# Patient Record
Sex: MALE | Race: WHITE | Employment: FULL TIME | URBAN - METROPOLITAN AREA
[De-identification: names, ages, dates, MRNs, and addresses within clinical notes are randomized per-mention and may not be internally consistent; named-entity substitution may affect disease eponyms.]

---

## 2023-09-01 ENCOUNTER — HOSPITAL ENCOUNTER (OUTPATIENT)
Age: 38
Setting detail: SPECIMEN
Discharge: HOME | End: 2023-09-01

## 2023-09-01 LAB
CHOLEST SERPL-MCNC: 166 MG/DL
CHOLESTEROL/HDL RATIO: 2.8
HDLC SERPL-MCNC: 60 MG/DL
LDLC SERPL CALC-MCNC: 94 MG/DL (ref 0–130)
TRIGL SERPL-MCNC: 58 MG/DL

## 2023-12-26 ENCOUNTER — TELEPHONE (OUTPATIENT)
Age: 38
End: 2023-12-26

## 2023-12-26 NOTE — TELEPHONE ENCOUNTER
Patient called asking for a prescription of Valacyclovir. Patient said it has been awhile since he has been on it and he is having a flair up.  Patient made a appointment for 12/28

## 2023-12-28 ENCOUNTER — OFFICE VISIT (OUTPATIENT)
Age: 38
End: 2023-12-28
Payer: COMMERCIAL

## 2023-12-28 VITALS
TEMPERATURE: 98 F | BODY MASS INDEX: 27.4 KG/M2 | HEART RATE: 69 BPM | DIASTOLIC BLOOD PRESSURE: 86 MMHG | OXYGEN SATURATION: 98 % | HEIGHT: 68 IN | WEIGHT: 180.8 LBS | SYSTOLIC BLOOD PRESSURE: 122 MMHG

## 2023-12-28 DIAGNOSIS — F33.0 MILD RECURRENT MAJOR DEPRESSION (HCC): ICD-10-CM

## 2023-12-28 DIAGNOSIS — E29.1 TESTICULAR HYPOFUNCTION: Primary | ICD-10-CM

## 2023-12-28 PROCEDURE — 99213 OFFICE O/P EST LOW 20 MIN: CPT | Performed by: PHYSICIAN ASSISTANT

## 2023-12-28 RX ORDER — BUPROPION HYDROCHLORIDE 300 MG/1
300 TABLET ORAL EVERY MORNING
Qty: 30 TABLET | Refills: 5 | Status: SHIPPED | OUTPATIENT
Start: 2023-12-28

## 2023-12-28 RX ORDER — TESTOSTERONE CYPIONATE 1000 MG/10ML
100 INJECTION, SOLUTION INTRAMUSCULAR ONCE
COMMUNITY

## 2023-12-28 RX ORDER — BUPROPION HYDROCHLORIDE 300 MG/1
300 TABLET ORAL EVERY MORNING
COMMUNITY
Start: 2023-07-24 | End: 2023-12-28 | Stop reason: SDUPTHER

## 2024-04-17 ENCOUNTER — OFFICE VISIT (OUTPATIENT)
Age: 39
End: 2024-04-17

## 2024-04-17 VITALS
BODY MASS INDEX: 27.28 KG/M2 | OXYGEN SATURATION: 98 % | WEIGHT: 180 LBS | SYSTOLIC BLOOD PRESSURE: 110 MMHG | HEIGHT: 68 IN | HEART RATE: 90 BPM | DIASTOLIC BLOOD PRESSURE: 82 MMHG | TEMPERATURE: 98 F

## 2024-04-17 DIAGNOSIS — E29.1 TESTICULAR HYPOFUNCTION: ICD-10-CM

## 2024-04-17 DIAGNOSIS — G47.33 OBSTRUCTIVE SLEEP APNEA: ICD-10-CM

## 2024-04-17 DIAGNOSIS — B07.0 PLANTAR WART OF RIGHT FOOT: ICD-10-CM

## 2024-04-17 DIAGNOSIS — F33.0 MILD RECURRENT MAJOR DEPRESSION (HCC): Primary | ICD-10-CM

## 2024-04-17 ASSESSMENT — PATIENT HEALTH QUESTIONNAIRE - PHQ9
SUM OF ALL RESPONSES TO PHQ QUESTIONS 1-9: 0
1. LITTLE INTEREST OR PLEASURE IN DOING THINGS: NOT AT ALL
8. MOVING OR SPEAKING SO SLOWLY THAT OTHER PEOPLE COULD HAVE NOTICED. OR THE OPPOSITE, BEING SO FIGETY OR RESTLESS THAT YOU HAVE BEEN MOVING AROUND A LOT MORE THAN USUAL: NOT AT ALL
SUM OF ALL RESPONSES TO PHQ QUESTIONS 1-9: 0
9. THOUGHTS THAT YOU WOULD BE BETTER OFF DEAD, OR OF HURTING YOURSELF: NOT AT ALL
7. TROUBLE CONCENTRATING ON THINGS, SUCH AS READING THE NEWSPAPER OR WATCHING TELEVISION: NOT AT ALL
2. FEELING DOWN, DEPRESSED OR HOPELESS: NOT AT ALL
SUM OF ALL RESPONSES TO PHQ QUESTIONS 1-9: 0
SUM OF ALL RESPONSES TO PHQ QUESTIONS 1-9: 0
SUM OF ALL RESPONSES TO PHQ9 QUESTIONS 1 & 2: 0
6. FEELING BAD ABOUT YOURSELF - OR THAT YOU ARE A FAILURE OR HAVE LET YOURSELF OR YOUR FAMILY DOWN: NOT AT ALL
4. FEELING TIRED OR HAVING LITTLE ENERGY: NOT AT ALL
3. TROUBLE FALLING OR STAYING ASLEEP: NOT AT ALL
5. POOR APPETITE OR OVEREATING: NOT AT ALL

## 2024-04-17 ASSESSMENT — ENCOUNTER SYMPTOMS
SHORTNESS OF BREATH: 0
CONSTIPATION: 0
SINUS PAIN: 0
BACK PAIN: 0
NAUSEA: 0
SORE THROAT: 0
EYE REDNESS: 0
SINUS PRESSURE: 0
VOMITING: 0
ABDOMINAL PAIN: 0
DIARRHEA: 0
COUGH: 0
EYE PAIN: 0
BLOOD IN STOOL: 0
RHINORRHEA: 0
WHEEZING: 0

## 2024-04-17 NOTE — PROGRESS NOTES
[facility]     Date of Visit:  2024  Patient Name: Rah Buckley   Patient :  1985     CHIEF COMPLAINT:     Rah Buckley is a 38 y.o. male who presents today for an general visit to be evaluated for the following condition(s):  Chief Complaint   Patient presents with    Medication Check    3 Month Follow-Up    Discuss Labs       HISTORY OF PRESENT ILLNESS      Last note reviewed from Dr. Lopes.  Patient required phlebotomy due to elevated hemoglobin.   RBC count down to 5.71 with hemoglobin 16.7 and hematocrit 40.6.  No mini stroke symptoms.now on half dose testosterone replacement. No depression issues. Going through a divorce, trying to get full custody of 5 kids.      Urinating well.     REVIEW OF SYSTEMS     Review of Systems   Constitutional:  Negative for chills, fever and unexpected weight change.   HENT:  Negative for congestion, ear pain, hearing loss, rhinorrhea, sinus pressure, sinus pain, sneezing and sore throat.    Eyes:  Negative for pain, redness and visual disturbance.   Respiratory:  Negative for cough, shortness of breath and wheezing.    Cardiovascular:  Negative for chest pain, palpitations and leg swelling.   Gastrointestinal:  Negative for abdominal pain, blood in stool, constipation, diarrhea, nausea and vomiting.   Endocrine: Negative for cold intolerance and heat intolerance.   Genitourinary:  Negative for difficulty urinating, dysuria, frequency, hematuria and urgency.   Musculoskeletal:  Negative for arthralgias, back pain, gait problem, joint swelling, myalgias and neck pain.   Skin:  Negative for rash and wound.   Allergic/Immunologic: Negative for immunocompromised state.   Neurological:  Negative for dizziness, tremors, seizures, syncope, speech difficulty, weakness, light-headedness, numbness and headaches.   Psychiatric/Behavioral:  Negative for confusion, hallucinations and sleep disturbance. The patient is not nervous/anxious.         REVIEWED

## 2024-04-24 ENCOUNTER — TELEPHONE (OUTPATIENT)
Age: 39
End: 2024-04-24

## 2024-04-24 NOTE — TELEPHONE ENCOUNTER
[employer] called to let us know that patient has been approved for his Sleep Study at [facility].  Authorization # [id number]

## 2024-04-30 ENCOUNTER — HOSPITAL ENCOUNTER (OUTPATIENT)
Dept: SLEEP CENTER | Age: 39
Discharge: HOME | End: 2024-05-02
Attending: INTERNAL MEDICINE
Payer: COMMERCIAL

## 2024-04-30 DIAGNOSIS — G47.33 OBSTRUCTIVE SLEEP APNEA: ICD-10-CM

## 2024-04-30 PROCEDURE — G0399 HOME SLEEP TEST/TYPE 3 PORTA: HCPCS

## 2024-05-13 LAB — STATUS: NORMAL

## 2024-05-17 DIAGNOSIS — G47.33 OBSTRUCTIVE SLEEP APNEA: Primary | ICD-10-CM

## 2024-06-06 ENCOUNTER — TELEPHONE (OUTPATIENT)
Age: 39
End: 2024-06-06

## 2024-06-06 NOTE — TELEPHONE ENCOUNTER
[employer] called Stacy stating that the CPAP titration study was denied due to Patient's condition/diagnosis does not qualify.  If we want to have it reviewed call [phone].     Called above # [phone] for Physician review, spoke with Bita who stated that the sleep titration study was denied base on the sleep study results.      I called [facility] ([phone]) they were the one who requested the CPAP titration sleep study, Spoke with Nneka and informed her that the CPAP titration sleep study was denied by the Patient's insurance.   Nneka said in this cases they will just set the Patient up with the CPAP machine and go from there, If that's OK with our office.   I informed Nneka that if insurance covers what it's fine with us and I will informed Dr. Okeefe.     Called and spoke with Patient and informed of the denial but Nneka from the sleep center said they will set him up with the CPAP machine.   Further informed him to call and speak with Nneka.

## 2024-09-18 DIAGNOSIS — F33.0 MILD RECURRENT MAJOR DEPRESSION (HCC): Primary | ICD-10-CM

## 2024-09-18 RX ORDER — BUPROPION HYDROCHLORIDE 300 MG/1
300 TABLET ORAL EVERY MORNING
Qty: 30 TABLET | Refills: 1 | Status: SHIPPED | OUTPATIENT
Start: 2024-09-18

## 2024-10-29 ENCOUNTER — OFFICE VISIT (OUTPATIENT)
Age: 39
End: 2024-10-29
Payer: COMMERCIAL

## 2024-10-29 VITALS
DIASTOLIC BLOOD PRESSURE: 80 MMHG | OXYGEN SATURATION: 97 % | TEMPERATURE: 98 F | HEART RATE: 97 BPM | HEIGHT: 68 IN | BODY MASS INDEX: 27.37 KG/M2 | SYSTOLIC BLOOD PRESSURE: 110 MMHG

## 2024-10-29 DIAGNOSIS — F33.0 MILD RECURRENT MAJOR DEPRESSION (HCC): Primary | ICD-10-CM

## 2024-10-29 DIAGNOSIS — G47.33 OBSTRUCTIVE SLEEP APNEA: ICD-10-CM

## 2024-10-29 DIAGNOSIS — M77.8 TENDINITIS OF RIGHT FOREARM: ICD-10-CM

## 2024-10-29 DIAGNOSIS — Z23 IMMUNIZATION DUE: ICD-10-CM

## 2024-10-29 DIAGNOSIS — E29.1 TESTICULAR HYPOFUNCTION: ICD-10-CM

## 2024-10-29 DIAGNOSIS — M85.80 OSTEOPENIA, UNSPECIFIED LOCATION: ICD-10-CM

## 2024-10-29 PROCEDURE — 99214 OFFICE O/P EST MOD 30 MIN: CPT | Performed by: INTERNAL MEDICINE

## 2024-10-29 RX ORDER — ALENDRONATE SODIUM 35 MG/1
35 TABLET ORAL
COMMUNITY
Start: 2024-08-28

## 2024-10-29 ASSESSMENT — ENCOUNTER SYMPTOMS
SHORTNESS OF BREATH: 0
BACK PAIN: 0
ABDOMINAL PAIN: 0
EYE REDNESS: 0
SINUS PRESSURE: 0
COUGH: 0
NAUSEA: 0
VOMITING: 0
SINUS PAIN: 0
CONSTIPATION: 0
RHINORRHEA: 0
BLOOD IN STOOL: 0
SORE THROAT: 0
EYE PAIN: 0
DIARRHEA: 0
WHEEZING: 0

## 2024-10-29 NOTE — PROGRESS NOTES
[facility]     Date of Visit:  10/29/2024  Patient Name: Rah Buckley   Patient :  1985     CHIEF COMPLAINT:     Rah Buckley is a 39 y.o. male who presents today for an general visit to be evaluated for the following condition(s):  Chief Complaint   Patient presents with    6 Month Follow-Up       HISTORY OF PRESENT ILLNESS    Overall doing well.  Work stresses stable.  He now has custody of his 14 and 16-year-old and will soon be getting custody of his 69-year-old.  His 90-year-old daughter is .  Still going through divorce issues.    He notes some right upper anterior chest \"hollow feeling\" once every few months.  He works out today doing 70 polyps 7 sets of 10 as well as push-ups and weightlifting.  He notes some right biceps and right forearm discomfort.  Right-handed.       REVIEW OF SYSTEMS     Review of Systems   Constitutional:  Negative for chills, fever and unexpected weight change.   HENT:  Negative for congestion, ear pain, hearing loss, rhinorrhea, sinus pressure, sinus pain, sneezing and sore throat.    Eyes:  Negative for pain, redness and visual disturbance.   Respiratory:  Negative for cough, shortness of breath and wheezing.    Cardiovascular:  Negative for chest pain, palpitations and leg swelling.   Gastrointestinal:  Negative for abdominal pain, blood in stool, constipation, diarrhea, nausea and vomiting.   Endocrine: Negative for cold intolerance and heat intolerance.   Genitourinary:  Negative for difficulty urinating, dysuria, frequency, hematuria and urgency.   Musculoskeletal:  Negative for arthralgias, back pain, gait problem, joint swelling, myalgias and neck pain.   Skin:  Negative for rash and wound.   Allergic/Immunologic: Negative for immunocompromised state.   Neurological:  Negative for dizziness, tremors, seizures, syncope, speech difficulty, weakness, light-headedness, numbness and headaches.   Psychiatric/Behavioral:  Negative for confusion, hallucinations

## 2024-11-20 DIAGNOSIS — F33.0 MILD RECURRENT MAJOR DEPRESSION (HCC): ICD-10-CM

## 2024-11-20 RX ORDER — BUPROPION HYDROCHLORIDE 300 MG/1
300 TABLET ORAL EVERY MORNING
Qty: 90 TABLET | Refills: 1 | Status: SHIPPED | OUTPATIENT
Start: 2024-11-20

## 2024-11-20 NOTE — TELEPHONE ENCOUNTER
buPROPion (WELLBUTRIN XL) 300 MG extended release tablet  Take 1 tablet by mouth every morning, Disp-30 tablet, R-1  Normal, Last Dose: Not Recorded    Patient took his last pill this morning

## 2025-02-11 ENCOUNTER — OFFICE VISIT (OUTPATIENT)
Age: 40
End: 2025-02-11
Payer: COMMERCIAL

## 2025-02-11 VITALS
HEART RATE: 60 BPM | HEIGHT: 68 IN | WEIGHT: 185 LBS | DIASTOLIC BLOOD PRESSURE: 86 MMHG | BODY MASS INDEX: 28.04 KG/M2 | TEMPERATURE: 97.7 F | OXYGEN SATURATION: 97 % | SYSTOLIC BLOOD PRESSURE: 116 MMHG

## 2025-02-11 DIAGNOSIS — G47.33 OSA (OBSTRUCTIVE SLEEP APNEA): ICD-10-CM

## 2025-02-11 DIAGNOSIS — M51.360 DEGENERATION OF INTERVERTEBRAL DISC OF LUMBAR REGION WITH DISCOGENIC BACK PAIN: Primary | ICD-10-CM

## 2025-02-11 DIAGNOSIS — F33.0 MILD RECURRENT MAJOR DEPRESSION (HCC): ICD-10-CM

## 2025-02-11 DIAGNOSIS — M85.80 OSTEOPENIA, UNSPECIFIED LOCATION: ICD-10-CM

## 2025-02-11 DIAGNOSIS — E29.1 TESTICULAR HYPOFUNCTION: ICD-10-CM

## 2025-02-11 PROCEDURE — 99214 OFFICE O/P EST MOD 30 MIN: CPT | Performed by: INTERNAL MEDICINE

## 2025-02-11 RX ORDER — MELOXICAM 15 MG/1
15 TABLET ORAL DAILY
Qty: 30 TABLET | Refills: 0 | Status: SHIPPED | OUTPATIENT
Start: 2025-02-11

## 2025-02-11 RX ORDER — CYCLOBENZAPRINE HCL 10 MG
10 TABLET ORAL 2 TIMES DAILY PRN
COMMUNITY
Start: 2025-02-06 | End: 2025-02-11 | Stop reason: SDUPTHER

## 2025-02-11 RX ORDER — CYCLOBENZAPRINE HCL 10 MG
10 TABLET ORAL 3 TIMES DAILY PRN
Qty: 30 TABLET | Refills: 1 | Status: SHIPPED | OUTPATIENT
Start: 2025-02-11

## 2025-02-11 SDOH — ECONOMIC STABILITY: FOOD INSECURITY: WITHIN THE PAST 12 MONTHS, THE FOOD YOU BOUGHT JUST DIDN'T LAST AND YOU DIDN'T HAVE MONEY TO GET MORE.: NEVER TRUE

## 2025-02-11 SDOH — ECONOMIC STABILITY: FOOD INSECURITY: WITHIN THE PAST 12 MONTHS, YOU WORRIED THAT YOUR FOOD WOULD RUN OUT BEFORE YOU GOT MONEY TO BUY MORE.: NEVER TRUE

## 2025-02-11 ASSESSMENT — ENCOUNTER SYMPTOMS
DIARRHEA: 0
VOMITING: 0
BLOOD IN STOOL: 0
SORE THROAT: 0
WHEEZING: 0
SHORTNESS OF BREATH: 0
RHINORRHEA: 0
EYE REDNESS: 0
SINUS PRESSURE: 0
BACK PAIN: 0
NAUSEA: 0
COUGH: 0
EYE PAIN: 0
ABDOMINAL PAIN: 0
SINUS PAIN: 0
CONSTIPATION: 0

## 2025-02-11 ASSESSMENT — PATIENT HEALTH QUESTIONNAIRE - PHQ9
1. LITTLE INTEREST OR PLEASURE IN DOING THINGS: NOT AT ALL
2. FEELING DOWN, DEPRESSED OR HOPELESS: NOT AT ALL
3. TROUBLE FALLING OR STAYING ASLEEP: NOT AT ALL
SUM OF ALL RESPONSES TO PHQ QUESTIONS 1-9: 0
SUM OF ALL RESPONSES TO PHQ QUESTIONS 1-9: 0
4. FEELING TIRED OR HAVING LITTLE ENERGY: NOT AT ALL
7. TROUBLE CONCENTRATING ON THINGS, SUCH AS READING THE NEWSPAPER OR WATCHING TELEVISION: NOT AT ALL
SUM OF ALL RESPONSES TO PHQ QUESTIONS 1-9: 0
SUM OF ALL RESPONSES TO PHQ9 QUESTIONS 1 & 2: 0
9. THOUGHTS THAT YOU WOULD BE BETTER OFF DEAD, OR OF HURTING YOURSELF: NOT AT ALL
8. MOVING OR SPEAKING SO SLOWLY THAT OTHER PEOPLE COULD HAVE NOTICED. OR THE OPPOSITE, BEING SO FIGETY OR RESTLESS THAT YOU HAVE BEEN MOVING AROUND A LOT MORE THAN USUAL: NOT AT ALL
5. POOR APPETITE OR OVEREATING: NOT AT ALL
SUM OF ALL RESPONSES TO PHQ QUESTIONS 1-9: 0
6. FEELING BAD ABOUT YOURSELF - OR THAT YOU ARE A FAILURE OR HAVE LET YOURSELF OR YOUR FAMILY DOWN: NOT AT ALL
10. IF YOU CHECKED OFF ANY PROBLEMS, HOW DIFFICULT HAVE THESE PROBLEMS MADE IT FOR YOU TO DO YOUR WORK, TAKE CARE OF THINGS AT HOME, OR GET ALONG WITH OTHER PEOPLE: NOT DIFFICULT AT ALL

## 2025-02-11 NOTE — PROGRESS NOTES
[facility]     Date of Visit:  2025  Patient Name: Rah Buckley   Patient :  1985     CHIEF COMPLAINT:     Rah Buckley is a 39 y.o. male who presents today for an general visit to be evaluated for the following condition(s):  Chief Complaint   Patient presents with    Lower Back Pain     2028 - [facility] ED       HISTORY OF PRESENT ILLNESS    39-year-old male with low back pain for the last 1-1/2 months.  At that time he was on a treadmill at the gym and felt as if his lower back was an accordion.  Symptoms not better with changes in position or or new chair at work.  He saw a chiropractor who told him he had some L5-S1 disc disease that was noted on x-ray.  He has seen a chiropractor 10 times in the last month for manipulation therapy.  Bowel movements and urinating okay.  No fever.  No prior history of back injury.  He also had a massage.    He was seen in the emergency department at [facility] .  No x-rays or testing were completed.  He was prescribed prednisone 50 mg a day x 4 days that he just completed.  Also on Flexeril as needed that he rarely uses.  Current back pain 2 out of 10.  It was 7-9 out of 10.  No radicular symptoms.    He went snowboarding for 1 day at [facility].  No falls.  He conquerred the [place].    DEXA scan 2024 shows osteopenia.  On alendronate 35 mg once per week as well as testosterone injections once a week through Dr. Lopes.    He has been taking ibuprofen 800 mg over-the-counter all 4 tablets at once.  No GI upset.    Denies any falls.  Weight up 5 pounds from 2023 in our scale.       REVIEW OF SYSTEMS     Review of Systems   Constitutional:  Negative for chills, fever and unexpected weight change.   HENT:  Negative for congestion, ear pain, hearing loss, rhinorrhea, sinus pressure, sinus pain, sneezing and sore throat.    Eyes:  Negative for pain, redness and visual disturbance.   Respiratory:  Negative for

## 2025-02-11 NOTE — PATIENT INSTRUCTIONS
health updates, keeping you well-informed and engaged in your healthcare journey.    5. Increased engagement and collaboration: Direct scheduling encourages greater patient engagement and empowers you to take an active role in managing your healthcare. By accessing the [employer] Patient Portal, you can securely message your healthcare provider, ask questions, request prescription refills, and seek medical advice whenever you need it.    To get started with direct scheduling through the [employer] Patient Portal or to register with [employer], simply visit [email or web address].     We understand that change can sometimes be overwhelming, but we assure you that adopting direct scheduling through the [employer] Patient Portal will enhance your healthcare experience and put you in control of your appointments. Our dedicated staff is available to answer any questions or provide assistance throughout the process.    Thank you for entrusting us with your healthcare needs. We are committed to continuously improving our services and ensuring your satisfaction. Together, let's embrace the future of healthcare convenience and accessibility through direct scheduling on the [employer] Patient Portal.    Wishing you good health and happiness,    [facility] Internal Medicine  MD Anne Powell PA-C

## 2025-02-18 ENCOUNTER — HOSPITAL ENCOUNTER (OUTPATIENT)
Age: 40
Setting detail: THERAPIES SERIES
Discharge: HOME | End: 2025-02-18
Attending: INTERNAL MEDICINE
Payer: COMMERCIAL

## 2025-02-18 PROCEDURE — 97161 PT EVAL LOW COMPLEX 20 MIN: CPT

## 2025-02-18 NOTE — CONSULTS
[] [facility]  Outpatient Rehabilitation &  Therapy  [address]  [phone]  [phone] [] [facility]  Outpatient Rehabilitation &  Therapy  [address]  P: [phone]  F: [phone] [] [facility]  Outpatient Rehabilitation &  Therapy  [address]  P: [phone]  F: [phone] [] [facility]  Outpatient Rehabilitation &  Therapy  [address]  [phone]  [phone] [] [facility]  Outpatient Rehabilitation &  Therapy  [address]   P: [phone]  F: [phone]  [] [facility]  Outpatient Rehabilitation &  Therapy  [address]  P: [phone]  F: [phone] [x] [facility]  Outpatient Rehabilitation &  Therapy  [address]  P: [phone]  F: [phone] [] [facility]  Outpatient Rehabilitation &  Therapy  [address]  P: [phone]  F: [phone] [] [facility]  Outpatient Rehabilitation &  Therapy  [address]  P: [phone]  F: [phone]  [] [facility] Outpatient Rehabilitation &  Therapy  [address]  P: [phone]  F: [phone]     Physical Therapy Spine Evaluation    Date:  2025  Patient: Rah Buckley  : 1985  MRN: [id number]  Physician: Drew Okeefe MD   Insurance: [employer] 20 visit limit (auth required)  Medical Diagnosis: degeneration of IV lumbar disc with discogenic back pain  Rehab Codes: M54.50, M62.830  Onset Date: 24  Next 's appt.: 3/7/25    Subjective:   CC: back pain  HPI: (onset date) Insidious onset back pain in 2024.  Went snowboarding in January which seemed to irritate it.  Went to the ER last month due to the pain and got an injection and was put on steroids which helped.  Has also had chiropractic treatment.

## 2025-02-21 ENCOUNTER — HOSPITAL ENCOUNTER (OUTPATIENT)
Age: 40
Setting detail: THERAPIES SERIES
Discharge: HOME | End: 2025-02-21
Attending: INTERNAL MEDICINE
Payer: COMMERCIAL

## 2025-02-21 PROCEDURE — 97140 MANUAL THERAPY 1/> REGIONS: CPT

## 2025-02-21 PROCEDURE — 97110 THERAPEUTIC EXERCISES: CPT

## 2025-02-21 NOTE — FLOWSHEET NOTE
patient reports that his symptoms are improving.  Seemed to do well with the few exercises given at the initial evaluation.  Added belted press ups to isolate extension mobility at the lower lumbar spine.  Added prone hip extension and supine bridging for lumbar strengthening.  Added manual lumbar traction (mechanical not covered), lumbar PA glides and SI distraction mobs.  Did very well with all aspects of treatment including exercises and manual techniques.  No pain during or after.  Did show signs of limited lumbar extension mobility during press ups.       [] Other:    [x] Patient would benefit from skilled physical therapy services in order to: decrease pain/spasm, improve ROM and improve activity including lifting, standing, sleeping, working out and recreational activity with minimal pain/difficulty.     STG: (to be met in 12 treatments)  ? Pain: 0-1/10 lumbar to improve lifting, standing, sleeping, working out and recreational activity.    ? ROM: hips to WNLs (via ALIREZA test) to improve lifting, standing, sleeping, working out and recreational activity.   ? Function: Oswestry to 2/50.   Patient to be independent with home exercise program as demonstrated by performance with correct form without cues.     LTG: (to be met in 20 treatments)  Patient will return to normal activity including lifting, standing, sleeping, working out and recreational activity with minimal pain/difficulty.          Pt. Education:  [x] Plans/Goals, Risks/Benefits discussed  [x] Home exercise program  Method of Education: [x] Verbal  [x] Demo  [] Written  Comprehension of Education:  [] Verbalizes understanding.  [] Demonstrates understanding.  [x] Needs Review.  [] Demonstrates/verbalizes understanding of HEP/Ed previously given.  2/18: see flowsheet for home program.  Patient did not want pictures of the exercises.          Plan: [x] Continue per plan of care.   [] Other:      Treatment Charges: Mins Units Time in/out   []

## 2025-02-24 ENCOUNTER — HOSPITAL ENCOUNTER (OUTPATIENT)
Age: 40
Setting detail: THERAPIES SERIES
Discharge: HOME | End: 2025-02-24
Attending: INTERNAL MEDICINE
Payer: COMMERCIAL

## 2025-02-24 PROCEDURE — 97140 MANUAL THERAPY 1/> REGIONS: CPT

## 2025-02-24 PROCEDURE — 97110 THERAPEUTIC EXERCISES: CPT

## 2025-02-24 NOTE — FLOWSHEET NOTE
[x] [facility]  Outpatient Rehabilitation & Therapy  [address] [place], [place] [address]    Physical Therapy Daily Treatment Note      Date:  2025  Patient Name:  Rah Buckley    :  1985  MRN: [id number]  Physician: Drew Okeefe MD                                   Insurance: [facility] 20 visit limit (auth required) *5 visits approved through *  Medical Diagnosis: degeneration of IV lumbar disc with discogenic back pain                  Rehab Codes: M54.50, M62.830  Onset Date: 24             Next 's appt.: 3/7/25  Visit# / total visits: 3/24  Cancels/No Shows: 0/0    Subjective:    Pain:  [x] Yes  [] No Location:  N/A Pain Rating: (0-10 scale) 0/10  Pain altered Tx:  [] No  [] Yes  Action:  Comments: Hasn't really had any pain in the past few days.  Doesn't know if the medicine is helping or exercises so far.  He states he currently does not feel any restrictions with home/ job duties- however he hasn't attempted heavy lifting/ aggressive activities.     No past medical history on file.  Past Surgical History:   Procedure Laterality Date    COSMETIC SURGERY      for gynecomastia, done in [place]    VASECTOMY  2018       Objective: walks into clinic in no   Modalities:   Precautions [x] No  [] Yes:  Exercises:  Exercise Reps/ Time Weight/ Level Comments   Prone lying 2'       KAILA 2'       Press ups 10x       Belted press ups  10x2    manual pressure    Prone hip ext (toe straight)  10x       Prone hip ext (toe out)  10x       Prone lumbar PA glides; magic SI stretch  6'       Prone man traction  3'   *also instruct in self traction             Piriformis stretch 15\"x3 bilat       Supine hip flexor stretch  1' maksim    added    Bridge  5-10x       Bridge on ball  add?                 Standing trunk extension x10                 Massage gun lumbar para's x4'       Heat/ice prn         Other:     Specific Instructions for next treatment:  Monitor response

## 2025-02-28 ENCOUNTER — HOSPITAL ENCOUNTER (OUTPATIENT)
Age: 40
Setting detail: THERAPIES SERIES
Discharge: HOME | End: 2025-02-28
Attending: INTERNAL MEDICINE
Payer: COMMERCIAL

## 2025-02-28 PROCEDURE — 97110 THERAPEUTIC EXERCISES: CPT

## 2025-02-28 PROCEDURE — 97140 MANUAL THERAPY 1/> REGIONS: CPT

## 2025-02-28 NOTE — FLOWSHEET NOTE
Standing 4 way kicks with tubing X10 maksim blue          Piriformis stretch 15\"x3 bilat       Supine hip flexor stretch  1' maksim NP    added 2/24   Bridge  5-10x NP       Bridge on ball  x10    added 2/28             Standing trunk extension X10 NP                 Massage gun lumbar para's x4'       Heat/ice prn         Other: Patient with time constraints today- deferred some of the ex's     Specific Instructions for next treatment:  Monitor response to today's visit and home program compliance.  Advance program as able.  Modalities prn.  See how he tolerated newly added ex's. Update HEP if necessary.    Assessment: [x] Progressing toward goals. Patient has been pain free since last session. Added prone on ball arm/ leg lifts, ball with bridging, paloff press, and standing 4 way hip with tubing. Patient very challenged with 4 way kicks (mostly balance), but otherwise had no issue with ex's (new or current).      [] No change.     [] Other:    [x] Patient would benefit from skilled physical therapy services in order to: decrease pain/spasm, improve ROM and improve activity including lifting, standing, sleeping, working out and recreational activity with minimal pain/difficulty.     STG: (to be met in 12 treatments)  ? Pain: 0-1/10 lumbar to improve lifting, standing, sleeping, working out and recreational activity.    ? ROM: hips to WNLs (via ALIREZA test) to improve lifting, standing, sleeping, working out and recreational activity.   ? Function: Oswestry to 2/50.   Patient to be independent with home exercise program as demonstrated by performance with correct form without cues.     LTG: (to be met in 20 treatments)  Patient will return to normal activity including lifting, standing, sleeping, working out and recreational activity with minimal pain/difficulty.          Pt. Education:  [x] Plans/Goals, Risks/Benefits discussed  [x] Home exercise program  Method of Education: [x] Verbal  [x] Demo  []

## 2025-03-03 ENCOUNTER — HOSPITAL ENCOUNTER (OUTPATIENT)
Age: 40
Setting detail: THERAPIES SERIES
Discharge: HOME | End: 2025-03-03
Attending: INTERNAL MEDICINE
Payer: COMMERCIAL

## 2025-03-03 PROCEDURE — 97140 MANUAL THERAPY 1/> REGIONS: CPT

## 2025-03-03 PROCEDURE — 97110 THERAPEUTIC EXERCISES: CPT

## 2025-03-03 NOTE — FLOWSHEET NOTE
in/out   []  Modalities      [x]  Ther Exercise 24 1    [x]  Manual Therapy 13 1    []  Ther Activities      []  Aquatics      []  Neuromuscular      [] Vasocompression      [] Gait Training      [] Dry needling        [] 1 or 2 muscles        [] 3 or more muscles      []  Other      Total Treatment time 37 2      Time In:  3:48 pm        Time Out: 4:25 pm    Electronically signed by:  Abby Bailey PTA

## 2025-03-05 ENCOUNTER — HOSPITAL ENCOUNTER (OUTPATIENT)
Age: 40
Setting detail: THERAPIES SERIES
Discharge: HOME | End: 2025-03-05
Attending: INTERNAL MEDICINE
Payer: COMMERCIAL

## 2025-03-05 PROCEDURE — 97110 THERAPEUTIC EXERCISES: CPT

## 2025-03-05 PROCEDURE — 97140 MANUAL THERAPY 1/> REGIONS: CPT

## 2025-03-05 NOTE — FLOWSHEET NOTE
[x] [facility]  Outpatient Rehabilitation & Therapy  [address] [place], [place] [address]    Physical Therapy Daily Treatment Note      Date:  3/5/2025  Patient Name:  Rah Buckley    :  1985  MRN: [id number]  Physician: Drew Okeefe MD                                   Insurance: [facility] 20 visit limit (auth required) *5 visits approved through *  Medical Diagnosis: degeneration of IV lumbar disc with discogenic back pain                  Rehab Codes: M54.50, M62.830  Onset Date: 24             Next 's appt.: 3/7/25  Visit# / total visits:  (EVAL + 5 treatments)  Cancels/No Shows: 0/0    Subjective:    Pain:  [x] Yes  [] No Location:  N/A Pain Rating: (0-10 scale) 0/10  Pain altered Tx:  [] No  [] Yes  Action:  Comments: Hasn't really had any pain lately. No issues after last session. Has been going to the gym and doing well so far.   He also plans to go snowboarding this weekend-\"this will be the true test\"  .  No past medical history on file.  Past Surgical History:   Procedure Laterality Date    COSMETIC SURGERY      for gynecomastia, done in [place]    VASECTOMY  2018       Objective: walks into clinic in no obvious distress  Modalities:   Precautions [x] No  [] Yes:  Exercise  Exercise Reps/ Time Weight/ Level Comments   Prone lying 2' (deferred)       KAILA 2'        Press ups 10x       Belted press ups  5x2    manual pressure    Prone hip ext (toe straight)  10x       Prone hip ext (toe out)  10x       \"Supermans\" 5x10\"  Added 3/5   Prone lumbar PA glides; magic SI stretch  6'       Prone man traction  3'   *also instruct in self traction    prone on ball alt UE/LE lifts x10    added   *also instructed in same side UE/LE lifts 3/   Plank on ball 10x5\"  Added 3/   Paloff press X10  blue Sitting on ball today   Standing 4 way kicks with tubing X10 maksim Blue/red *with UE flexion/extension         Piriformis stretch 15\"x3 bilat    *instructed in ER

## 2025-03-13 DIAGNOSIS — M51.360 DEGENERATION OF INTERVERTEBRAL DISC OF LUMBAR REGION WITH DISCOGENIC BACK PAIN: ICD-10-CM

## 2025-03-13 NOTE — TELEPHONE ENCOUNTER
Rah Buckley is calling to request a refill on the following medication(s):    Last Visit Date (If Applicable):  2/11/2025    Next Visit Date:    3/17/2025    Medication Request:  Requested Prescriptions     Pending Prescriptions Disp Refills    meloxicam (MOBIC) 15 MG tablet [Pharmacy Med Name: MELOXICAM 15 MG TABLET] 30 tablet 0     Sig: Take 1 tablet by mouth daily

## 2025-03-14 RX ORDER — MELOXICAM 15 MG/1
15 TABLET ORAL DAILY
Qty: 30 TABLET | Refills: 2 | Status: SHIPPED | OUTPATIENT
Start: 2025-03-14

## 2025-03-17 ENCOUNTER — OFFICE VISIT (OUTPATIENT)
Age: 40
End: 2025-03-17
Payer: COMMERCIAL

## 2025-03-17 VITALS
DIASTOLIC BLOOD PRESSURE: 88 MMHG | HEIGHT: 68 IN | WEIGHT: 190 LBS | HEART RATE: 77 BPM | SYSTOLIC BLOOD PRESSURE: 126 MMHG | TEMPERATURE: 97.6 F | OXYGEN SATURATION: 98 % | BODY MASS INDEX: 28.79 KG/M2

## 2025-03-17 DIAGNOSIS — M51.360 DEGENERATION OF INTERVERTEBRAL DISC OF LUMBAR REGION WITH DISCOGENIC BACK PAIN: Primary | ICD-10-CM

## 2025-03-17 DIAGNOSIS — E29.1 TESTICULAR HYPOFUNCTION: ICD-10-CM

## 2025-03-17 DIAGNOSIS — G47.33 OSA (OBSTRUCTIVE SLEEP APNEA): ICD-10-CM

## 2025-03-17 DIAGNOSIS — F33.0 MILD RECURRENT MAJOR DEPRESSION: ICD-10-CM

## 2025-03-17 DIAGNOSIS — R74.01 ELEVATED ALT MEASUREMENT: ICD-10-CM

## 2025-03-17 DIAGNOSIS — M85.88 OSTEOPENIA OF LUMBAR SPINE: ICD-10-CM

## 2025-03-17 PROCEDURE — 99214 OFFICE O/P EST MOD 30 MIN: CPT | Performed by: INTERNAL MEDICINE

## 2025-03-17 ASSESSMENT — ENCOUNTER SYMPTOMS
DIARRHEA: 0
EYE PAIN: 0
SINUS PRESSURE: 0
VOMITING: 0
CONSTIPATION: 0
ABDOMINAL PAIN: 0
NAUSEA: 0
SORE THROAT: 0
RHINORRHEA: 0
BLOOD IN STOOL: 0
BACK PAIN: 0
WHEEZING: 0
COUGH: 0
SHORTNESS OF BREATH: 0
EYE REDNESS: 0
SINUS PAIN: 0

## 2025-03-17 NOTE — PROGRESS NOTES
rare Alc use. Dr. Lopes  is monitoring.       Return in about 6 months (around 9/17/2025).    COMMUNICATION:       Electronically signed by Drew Okeefe MD on 3/17/2025 at 12:15 PM

## 2025-05-13 DIAGNOSIS — F33.0 MILD RECURRENT MAJOR DEPRESSION: ICD-10-CM

## 2025-05-13 RX ORDER — BUPROPION HYDROCHLORIDE 300 MG/1
300 TABLET ORAL EVERY MORNING
Qty: 90 TABLET | Refills: 1 | Status: SHIPPED | OUTPATIENT
Start: 2025-05-13

## 2025-05-13 NOTE — TELEPHONE ENCOUNTER
Rah Buckley is calling to request a refill on the following medication(s):    Last Visit Date (If Applicable):  3/17/2025    Next Visit Date:    9/23/2025    Medication Request:  Requested Prescriptions     Pending Prescriptions Disp Refills    buPROPion (WELLBUTRIN XL) 300 MG extended release tablet [Pharmacy Med Name: buPROPion HCL  MG TABLET] 30 tablet      Sig: TAKE 1 TABLET BY MOUTH EVERY MORNING

## 2025-06-20 DIAGNOSIS — M51.360 DEGENERATION OF INTERVERTEBRAL DISC OF LUMBAR REGION WITH DISCOGENIC BACK PAIN: ICD-10-CM

## 2025-06-20 NOTE — TELEPHONE ENCOUNTER
Rah Buckley is calling to request a refill on the following medication(s):    Last Visit Date (If Applicable):  3/17/2025    Next Visit Date:    9/23/2025    Medication Request:  Requested Prescriptions     Pending Prescriptions Disp Refills    meloxicam (MOBIC) 15 MG tablet [Pharmacy Med Name: MELOXICAM 15 MG TABLET] 30 tablet 2     Sig: TAKE 1 TABLET BY MOUTH DAILY

## 2025-06-21 RX ORDER — MELOXICAM 15 MG/1
15 TABLET ORAL DAILY
Qty: 30 TABLET | Refills: 2 | Status: SHIPPED | OUTPATIENT
Start: 2025-06-21